# Patient Record
Sex: MALE | Race: WHITE | Employment: OTHER | URBAN - METROPOLITAN AREA
[De-identification: names, ages, dates, MRNs, and addresses within clinical notes are randomized per-mention and may not be internally consistent; named-entity substitution may affect disease eponyms.]

---

## 2018-04-27 PROBLEM — N18.2 TYPE 2 DIABETES MELLITUS WITH STAGE 2 CHRONIC KIDNEY DISEASE, WITH LONG-TERM CURRENT USE OF INSULIN (HCC): Status: ACTIVE | Noted: 2017-01-06

## 2018-04-27 PROBLEM — E66.9 OBESITY: Status: ACTIVE | Noted: 2017-01-06

## 2018-04-27 PROBLEM — N18.2 HYPERTENSIVE HEART AND KIDNEY DISEASE WITHOUT HEART FAILURE AND WITH STAGE 2 CHRONIC KIDNEY DISEASE: Status: ACTIVE | Noted: 2017-01-06

## 2018-04-27 PROBLEM — E11.22 TYPE 2 DIABETES MELLITUS WITH STAGE 2 CHRONIC KIDNEY DISEASE, WITH LONG-TERM CURRENT USE OF INSULIN (HCC): Status: ACTIVE | Noted: 2017-01-06

## 2018-04-27 PROBLEM — Z79.4 TYPE 2 DIABETES MELLITUS WITH STAGE 2 CHRONIC KIDNEY DISEASE, WITH LONG-TERM CURRENT USE OF INSULIN (HCC): Status: ACTIVE | Noted: 2017-01-06

## 2018-04-27 PROBLEM — I13.10 HYPERTENSIVE HEART AND KIDNEY DISEASE WITHOUT HEART FAILURE AND WITH STAGE 2 CHRONIC KIDNEY DISEASE: Status: ACTIVE | Noted: 2017-01-06

## 2018-04-27 PROBLEM — N18.2 CHRONIC KIDNEY DISEASE, STAGE II (MILD): Status: ACTIVE | Noted: 2017-01-06

## 2018-04-27 PROBLEM — E66.01 CLASS 3 SEVERE OBESITY DUE TO EXCESS CALORIES WITH SERIOUS COMORBIDITY AND BODY MASS INDEX (BMI) OF 40.0 TO 44.9 IN ADULT (HCC): Status: ACTIVE | Noted: 2017-01-06

## 2018-04-30 PROCEDURE — 87427 SHIGA-LIKE TOXIN AG IA: CPT | Performed by: INTERNAL MEDICINE

## 2018-04-30 PROCEDURE — 87077 CULTURE AEROBIC IDENTIFY: CPT | Performed by: INTERNAL MEDICINE

## 2018-04-30 PROCEDURE — 87045 FECES CULTURE AEROBIC BACT: CPT | Performed by: INTERNAL MEDICINE

## 2018-04-30 PROCEDURE — 87046 STOOL CULTR AEROBIC BACT EA: CPT | Performed by: INTERNAL MEDICINE

## 2018-07-25 PROBLEM — I25.10 ATHEROSCLEROSIS OF CORONARY ARTERY: Status: ACTIVE | Noted: 2018-07-25

## 2018-07-25 PROBLEM — I45.10 RBBB (RIGHT BUNDLE BRANCH BLOCK): Status: ACTIVE | Noted: 2018-07-25

## 2021-04-16 ENCOUNTER — TELEPHONE (OUTPATIENT)
Dept: FAMILY MEDICINE CLINIC | Facility: CLINIC | Age: 76
End: 2021-04-16

## 2021-04-16 NOTE — TELEPHONE ENCOUNTER
Surgery on 05/03/21, Nanette Freeman with Dr. Hansa Serna @ [address] [place] [address]    H&P- complete  Labs- FBS (138), A1C (7.5), HGB (12.9), Calc Osmo (296), AST (8), MRSA neg, all other labs WNL  EKG- abnormal  X-ray- WNL    Cardio- Dr. Judson Danielle in [address] [phone]   [phone]  Ca

## 2021-04-20 ENCOUNTER — HOSPITAL ENCOUNTER (OUTPATIENT)
Dept: GENERAL RADIOLOGY | Facility: HOSPITAL | Age: 76
Discharge: HOME | End: 2021-04-20
Attending: FAMILY MEDICINE
Payer: MEDICARE

## 2021-04-20 ENCOUNTER — LAB ENCOUNTER (OUTPATIENT)
Dept: LAB | Facility: HOSPITAL | Age: 76
End: 2021-04-20
Attending: FAMILY MEDICINE
Payer: MEDICARE

## 2021-04-20 ENCOUNTER — OFFICE VISIT (OUTPATIENT)
Dept: FAMILY MEDICINE CLINIC | Facility: CLINIC | Age: 76
End: 2021-04-20
Payer: MEDICARE

## 2021-04-20 VITALS
WEIGHT: 263 LBS | SYSTOLIC BLOOD PRESSURE: 132 MMHG | HEART RATE: 98 BPM | BODY MASS INDEX: 35.62 KG/M2 | HEIGHT: 72 IN | DIASTOLIC BLOOD PRESSURE: 70 MMHG | RESPIRATION RATE: 16 BRPM | OXYGEN SATURATION: 98 % | TEMPERATURE: 98 F

## 2021-04-20 DIAGNOSIS — G47.33 OSA ON CPAP: ICD-10-CM

## 2021-04-20 DIAGNOSIS — N18.2 TYPE 2 DIABETES MELLITUS WITH STAGE 2 CHRONIC KIDNEY DISEASE, WITH LONG-TERM CURRENT USE OF INSULIN (HCC): ICD-10-CM

## 2021-04-20 DIAGNOSIS — I13.10 HYPERTENSIVE HEART AND KIDNEY DISEASE WITHOUT HEART FAILURE AND WITH STAGE 2 CHRONIC KIDNEY DISEASE: ICD-10-CM

## 2021-04-20 DIAGNOSIS — Z01.812 PRE-OPERATIVE LABORATORY EXAMINATION: ICD-10-CM

## 2021-04-20 DIAGNOSIS — N35.819 OTHER STRICTURE OF URETHRA IN MALE: ICD-10-CM

## 2021-04-20 DIAGNOSIS — Z01.818 PREOP EXAMINATION: ICD-10-CM

## 2021-04-20 DIAGNOSIS — N52.9 VASCULOGENIC ERECTILE DYSFUNCTION, UNSPECIFIED VASCULOGENIC ERECTILE DYSFUNCTION TYPE: ICD-10-CM

## 2021-04-20 DIAGNOSIS — H25.89 OTHER AGE-RELATED CATARACT OF BOTH EYES: ICD-10-CM

## 2021-04-20 DIAGNOSIS — Z88.9 ALLERGY HISTORY, DRUG: ICD-10-CM

## 2021-04-20 DIAGNOSIS — M17.12 PRIMARY OSTEOARTHRITIS OF LEFT KNEE: Primary | ICD-10-CM

## 2021-04-20 DIAGNOSIS — Z94.7 HISTORY OF CORNEAL TRANSPLANT: ICD-10-CM

## 2021-04-20 DIAGNOSIS — E66.01 CLASS 3 SEVERE OBESITY DUE TO EXCESS CALORIES WITH SERIOUS COMORBIDITY AND BODY MASS INDEX (BMI) OF 40.0 TO 44.9 IN ADULT (HCC): ICD-10-CM

## 2021-04-20 DIAGNOSIS — Z87.440 HX OF RECURRENT URINARY TRACT INFECTION: ICD-10-CM

## 2021-04-20 DIAGNOSIS — E78.2 MIXED HYPERLIPIDEMIA: ICD-10-CM

## 2021-04-20 DIAGNOSIS — N40.1 BENIGN NON-NODULAR PROSTATIC HYPERPLASIA WITH LOWER URINARY TRACT SYMPTOMS: ICD-10-CM

## 2021-04-20 DIAGNOSIS — I25.10 ATHEROSCLEROSIS OF NATIVE CORONARY ARTERY OF NATIVE HEART WITHOUT ANGINA PECTORIS: ICD-10-CM

## 2021-04-20 DIAGNOSIS — Z79.4 TYPE 2 DIABETES MELLITUS WITH STAGE 2 CHRONIC KIDNEY DISEASE, WITH LONG-TERM CURRENT USE OF INSULIN (HCC): ICD-10-CM

## 2021-04-20 DIAGNOSIS — Z99.89 OSA ON CPAP: ICD-10-CM

## 2021-04-20 DIAGNOSIS — N52.9 ERECTILE DYSFUNCTION OF ORGANIC ORIGIN: ICD-10-CM

## 2021-04-20 DIAGNOSIS — K57.90 DIVERTICULOSIS: ICD-10-CM

## 2021-04-20 DIAGNOSIS — I48.4 ATYPICAL ATRIAL FLUTTER (HCC): ICD-10-CM

## 2021-04-20 DIAGNOSIS — N18.2 CHRONIC KIDNEY DISEASE, STAGE II (MILD): ICD-10-CM

## 2021-04-20 DIAGNOSIS — D64.9 CHRONIC ANEMIA: ICD-10-CM

## 2021-04-20 DIAGNOSIS — E11.22 TYPE 2 DIABETES MELLITUS WITH STAGE 2 CHRONIC KIDNEY DISEASE, WITH LONG-TERM CURRENT USE OF INSULIN (HCC): ICD-10-CM

## 2021-04-20 DIAGNOSIS — K21.9 GERD WITHOUT ESOPHAGITIS: ICD-10-CM

## 2021-04-20 DIAGNOSIS — I45.10 RBBB (RIGHT BUNDLE BRANCH BLOCK): ICD-10-CM

## 2021-04-20 DIAGNOSIS — E55.9 VITAMIN D DEFICIENCY: ICD-10-CM

## 2021-04-20 DIAGNOSIS — I10 ESSENTIAL HYPERTENSION, BENIGN: ICD-10-CM

## 2021-04-20 DIAGNOSIS — N18.2 HYPERTENSIVE HEART AND KIDNEY DISEASE WITHOUT HEART FAILURE AND WITH STAGE 2 CHRONIC KIDNEY DISEASE: ICD-10-CM

## 2021-04-20 PROCEDURE — 80053 COMPREHEN METABOLIC PANEL: CPT

## 2021-04-20 PROCEDURE — 85025 COMPLETE CBC W/AUTO DIFF WBC: CPT

## 2021-04-20 PROCEDURE — 99204 OFFICE O/P NEW MOD 45 MIN: CPT | Performed by: FAMILY MEDICINE

## 2021-04-20 PROCEDURE — 36415 COLL VENOUS BLD VENIPUNCTURE: CPT

## 2021-04-20 PROCEDURE — 81015 MICROSCOPIC EXAM OF URINE: CPT

## 2021-04-20 PROCEDURE — 3008F BODY MASS INDEX DOCD: CPT | Performed by: FAMILY MEDICINE

## 2021-04-20 PROCEDURE — 86900 BLOOD TYPING SEROLOGIC ABO: CPT

## 2021-04-20 PROCEDURE — 86850 RBC ANTIBODY SCREEN: CPT

## 2021-04-20 PROCEDURE — 71046 X-RAY EXAM CHEST 2 VIEWS: CPT | Performed by: FAMILY MEDICINE

## 2021-04-20 PROCEDURE — 93010 ELECTROCARDIOGRAM REPORT: CPT | Performed by: FAMILY MEDICINE

## 2021-04-20 PROCEDURE — 83036 HEMOGLOBIN GLYCOSYLATED A1C: CPT

## 2021-04-20 PROCEDURE — 3078F DIAST BP <80 MM HG: CPT | Performed by: FAMILY MEDICINE

## 2021-04-20 PROCEDURE — 93005 ELECTROCARDIOGRAM TRACING: CPT

## 2021-04-20 PROCEDURE — 3075F SYST BP GE 130 - 139MM HG: CPT | Performed by: FAMILY MEDICINE

## 2021-04-20 PROCEDURE — 87081 CULTURE SCREEN ONLY: CPT

## 2021-04-20 PROCEDURE — 86901 BLOOD TYPING SEROLOGIC RH(D): CPT

## 2021-04-20 RX ORDER — DULAGLUTIDE 0.75 MG/.5ML
1 INJECTION, SOLUTION SUBCUTANEOUS WEEKLY
COMMUNITY

## 2021-04-20 RX ORDER — CHLORAL HYDRATE 500 MG
1000 CAPSULE ORAL 2 TIMES DAILY
COMMUNITY

## 2021-04-20 RX ORDER — LINAGLIPTIN 5 MG/1
5 TABLET, FILM COATED ORAL EVERY EVENING
COMMUNITY

## 2021-04-20 NOTE — H&P (VIEW-ONLY)
[address] [facility] PRE-OP CLINIC [place]    PRE-OP NOTE    HPI:   I have been consulted by Dr. Brayan Cook to see Billy Alectisha 70year old male for a preoperative evaluation and medical clearance.  He has a long history of worsening severe degenerative arthritis of MG Oral Capsule Delayed Release TAKE 1 CAPSULE DAILY 90 capsule 0   • Vitamin D3 1000 units Oral Tab Take 2,000 Units by mouth 2 (two) times daily. • Sildenafil Citrate 20 MG Oral Tab Take 1 tablet (20 mg total) by mouth daily.  (Patient taking differen History    Socioeconomic History      Marital status:       Spouse name: Not on file      Number of children: Not on file      Years of education: Not on file      Highest education level: Not on file    Occupational History      Not on file    [place] palpitations, edema, dyspnea  RESPIRATORY: he is on treatment forOSA, Denies shortness of breath, wheezing, cough  GASTROINTESTINAL:  Denies abdominal pain, nausea, vomiting, constipation, diarrhea, or blood in stool.   MUSCULOSKELETAL: left knee arthritic (L) 04/20/2021    HCT 40.3 04/20/2021    .0 04/20/2021    CREATSERUM 0.99 04/20/2021    BUN 18 04/20/2021     04/20/2021    K 4.0 04/20/2021     04/20/2021    CO2 28.0 04/20/2021     (H) 04/20/2021    CA 9.6 04/20/2021    ALB 3.8 transplant    (G47.33,  Z99.89) DELFINO on CPAP    (N40.1) Benign non-nodular prostatic hyperplasia with lower urinary tract symptoms    (I48.4) Atypical atrial flutter (HCC)    (E55.9) Vitamin D deficiency    (E78.2) Mixed hyperlipidemia    (N52.9) Erectile d with serious comorbidity and body mass index (BMI) of 40.0 to 44.9 in adult [facility])     Iron deficiency anemia     Cataract     Chronic anemia     Atherosclerosis of coronary artery     Essential hypertension, benign     History of corneal transplant     DELFINO

## 2021-04-20 NOTE — H&P
[address] [facility]IC [facility]    PRE-OP NOTE    HPI:   I have been consulted by Dr. Mily Gongora to see Niharika Masterson 70year old male for a preoperative evaluation and medical clearance.  He has a long history of worsening severe degenerative arthritis of MG Oral Capsule Delayed Release TAKE 1 CAPSULE DAILY 90 capsule 0   • Vitamin D3 1000 units Oral Tab Take 2,000 Units by mouth 2 (two) times daily. • Sildenafil Citrate 20 MG Oral Tab Take 1 tablet (20 mg total) by mouth daily.  (Patient taking differen History    Socioeconomic History      Marital status:       Spouse name: Not on file      Number of children: Not on file      Years of education: Not on file      Highest education level: Not on file    Occupational History      Not on file    [place] palpitations, edema, dyspnea  RESPIRATORY: he is on treatment forOSA, Denies shortness of breath, wheezing, cough  GASTROINTESTINAL:  Denies abdominal pain, nausea, vomiting, constipation, diarrhea, or blood in stool.   MUSCULOSKELETAL: left knee arthritic (L) 04/20/2021    HCT 40.3 04/20/2021    .0 04/20/2021    CREATSERUM 0.99 04/20/2021    BUN 18 04/20/2021     04/20/2021    K 4.0 04/20/2021     04/20/2021    CO2 28.0 04/20/2021     (H) 04/20/2021    CA 9.6 04/20/2021    ALB 3.8 transplant    (G47.33,  Z99.89) DELFINO on CPAP    (N40.1) Benign non-nodular prostatic hyperplasia with lower urinary tract symptoms    (I48.4) Atypical atrial flutter (HCC)    (E55.9) Vitamin D deficiency    (E78.2) Mixed hyperlipidemia    (N52.9) Erectile d with serious comorbidity and body mass index (BMI) of 40.0 to 44.9 in adult [facility])     Iron deficiency anemia     Cataract     Chronic anemia     Atherosclerosis of coronary artery     Essential hypertension, benign     History of corneal transplant     DELFINO

## 2021-04-21 PROBLEM — M17.12 OSTEOARTHRITIS OF LEFT KNEE: Status: ACTIVE | Noted: 2021-04-21

## 2021-04-21 NOTE — TELEPHONE ENCOUNTER
Dr. Aly Rodriguez, please review:    Labs- FBS (138), A1C (7.5), HGB (12.9), Calc Osmo (296), AST (8), MRSA neg, all other labs WNL  EKG- abnormal  X-ray- WNL    Cardio- Dr. Jane Anderson in [address] [phone]  - [phone]  Cardiac Clearance scanned into Media 04/

## 2021-04-21 NOTE — DISCHARGE SUMMARY
Ortho Discharge Summary     Patient ID:  Alejandrina Tinajero  [id number]  51 year old  7/4/1945      Admit Date: 5/3/2021  6:55 AM    Discharge Date and Time: 5/4/2021  2:24 PM      Attending Physician: Tiera Clemens MD     Reason for admission: left knee D

## 2021-04-23 NOTE — TELEPHONE ENCOUNTER
Spoke to patient, went over test results and let him know he is clear for surgery per Dr. Calvin Maynard.  Patient will anticipate a phone call from the hospital to schedule COVID test.

## 2021-04-30 ENCOUNTER — LAB ENCOUNTER (OUTPATIENT)
Dept: LAB | Age: 76
End: 2021-04-30
Attending: ORTHOPAEDIC SURGERY
Payer: MEDICARE

## 2021-04-30 DIAGNOSIS — Z01.818 PREOP TESTING: ICD-10-CM

## 2021-04-30 RX ORDER — OXYCODONE HYDROCHLORIDE 5 MG/1
10 TABLET ORAL ONCE
Status: CANCELLED | OUTPATIENT
Start: 2021-04-30 | End: 2021-04-30

## 2021-05-03 ENCOUNTER — APPOINTMENT (OUTPATIENT)
Dept: GENERAL RADIOLOGY | Facility: HOSPITAL | Age: 76
End: 2021-05-03
Attending: PHYSICIAN ASSISTANT
Payer: MEDICARE

## 2021-05-03 ENCOUNTER — ANESTHESIA EVENT (OUTPATIENT)
Dept: SURGERY | Facility: HOSPITAL | Age: 76
End: 2021-05-03
Payer: MEDICARE

## 2021-05-03 ENCOUNTER — ANESTHESIA (OUTPATIENT)
Dept: SURGERY | Facility: HOSPITAL | Age: 76
End: 2021-05-03
Payer: MEDICARE

## 2021-05-03 ENCOUNTER — HOSPITAL ENCOUNTER (OUTPATIENT)
Facility: HOSPITAL | Age: 76
Discharge: HOME HEALTH | End: 2021-05-04
Attending: ORTHOPAEDIC SURGERY | Admitting: ORTHOPAEDIC SURGERY
Payer: MEDICARE

## 2021-05-03 DIAGNOSIS — Z01.818 PREOP TESTING: ICD-10-CM

## 2021-05-03 DIAGNOSIS — M17.12 PRIMARY OSTEOARTHRITIS OF LEFT KNEE: Primary | ICD-10-CM

## 2021-05-03 PROCEDURE — 94660 CPAP INITIATION&MGMT: CPT

## 2021-05-03 PROCEDURE — 88305 TISSUE EXAM BY PATHOLOGIST: CPT | Performed by: ORTHOPAEDIC SURGERY

## 2021-05-03 PROCEDURE — 97161 PT EVAL LOW COMPLEX 20 MIN: CPT

## 2021-05-03 PROCEDURE — 82962 GLUCOSE BLOOD TEST: CPT

## 2021-05-03 PROCEDURE — 0SRD069 REPLACEMENT OF LEFT KNEE JOINT WITH OXIDIZED ZIRCONIUM ON POLYETHYLENE SYNTHETIC SUBSTITUTE, CEMENTED, OPEN APPROACH: ICD-10-PCS | Performed by: ORTHOPAEDIC SURGERY

## 2021-05-03 PROCEDURE — 80053 COMPREHEN METABOLIC PANEL: CPT | Performed by: PHYSICIAN ASSISTANT

## 2021-05-03 PROCEDURE — 97110 THERAPEUTIC EXERCISES: CPT

## 2021-05-03 PROCEDURE — 85018 HEMOGLOBIN: CPT | Performed by: PHYSICIAN ASSISTANT

## 2021-05-03 PROCEDURE — 73560 X-RAY EXAM OF KNEE 1 OR 2: CPT | Performed by: PHYSICIAN ASSISTANT

## 2021-05-03 PROCEDURE — 85014 HEMATOCRIT: CPT | Performed by: PHYSICIAN ASSISTANT

## 2021-05-03 PROCEDURE — 94002 VENT MGMT INPAT INIT DAY: CPT

## 2021-05-03 PROCEDURE — 88311 DECALCIFY TISSUE: CPT | Performed by: ORTHOPAEDIC SURGERY

## 2021-05-03 DEVICE — PSN FEM CR CMT CCR STD SZ11 L: Type: IMPLANTABLE DEVICE | Site: KNEE | Status: FUNCTIONAL

## 2021-05-03 DEVICE — CEM BN NLTX STRL REUSE MED VSC: Type: IMPLANTABLE DEVICE | Site: KNEE | Status: FUNCTIONAL

## 2021-05-03 DEVICE — PSN ALL POLY PAT PLY 38MM: Type: IMPLANTABLE DEVICE | Site: KNEE | Status: FUNCTIONAL

## 2021-05-03 DEVICE — PSN TIB STM 5 DEG SZ G L: Type: IMPLANTABLE DEVICE | Site: KNEE | Status: FUNCTIONAL

## 2021-05-03 DEVICE — IMPLANTABLE DEVICE: Type: IMPLANTABLE DEVICE | Site: KNEE | Status: FUNCTIONAL

## 2021-05-03 RX ORDER — MORPHINE SULFATE 4 MG/ML
2 INJECTION, SOLUTION INTRAMUSCULAR; INTRAVENOUS EVERY 10 MIN PRN
Status: DISCONTINUED | OUTPATIENT
Start: 2021-05-03 | End: 2021-05-03 | Stop reason: HOSPADM

## 2021-05-03 RX ORDER — MIDAZOLAM HYDROCHLORIDE 1 MG/ML
INJECTION INTRAMUSCULAR; INTRAVENOUS AS NEEDED
Status: DISCONTINUED | OUTPATIENT
Start: 2021-05-03 | End: 2021-05-03 | Stop reason: SURG

## 2021-05-03 RX ORDER — ONDANSETRON 4 MG/1
4 TABLET, FILM COATED ORAL EVERY 8 HOURS PRN
Qty: 30 TABLET | Refills: 0 | Status: SHIPPED | OUTPATIENT
Start: 2021-05-03

## 2021-05-03 RX ORDER — MAGNESIUM OXIDE 400 MG (241.3 MG MAGNESIUM) TABLET
1 TABLET NIGHTLY
Status: DISCONTINUED | OUTPATIENT
Start: 2021-05-03 | End: 2021-05-04

## 2021-05-03 RX ORDER — DOCUSATE SODIUM 100 MG/1
100 CAPSULE, LIQUID FILLED ORAL 2 TIMES DAILY
Qty: 60 CAPSULE | Refills: 2 | Status: SHIPPED | OUTPATIENT
Start: 2021-05-03

## 2021-05-03 RX ORDER — HYDROMORPHONE HYDROCHLORIDE 1 MG/ML
0.4 INJECTION, SOLUTION INTRAMUSCULAR; INTRAVENOUS; SUBCUTANEOUS EVERY 5 MIN PRN
Status: DISCONTINUED | OUTPATIENT
Start: 2021-05-03 | End: 2021-05-03 | Stop reason: HOSPADM

## 2021-05-03 RX ORDER — PROCHLORPERAZINE EDISYLATE 5 MG/ML
10 INJECTION INTRAMUSCULAR; INTRAVENOUS EVERY 6 HOURS PRN
Status: DISCONTINUED | OUTPATIENT
Start: 2021-05-03 | End: 2021-05-04

## 2021-05-03 RX ORDER — TRAMADOL HYDROCHLORIDE 50 MG/1
TABLET ORAL EVERY 4 HOURS PRN
Qty: 60 TABLET | Refills: 0 | Status: SHIPPED | OUTPATIENT
Start: 2021-05-03

## 2021-05-03 RX ORDER — ONDANSETRON 2 MG/ML
INJECTION INTRAMUSCULAR; INTRAVENOUS AS NEEDED
Status: DISCONTINUED | OUTPATIENT
Start: 2021-05-03 | End: 2021-05-03 | Stop reason: SURG

## 2021-05-03 RX ORDER — CEFAZOLIN SODIUM/WATER 2 G/20 ML
2 SYRINGE (ML) INTRAVENOUS EVERY 8 HOURS
Status: COMPLETED | OUTPATIENT
Start: 2021-05-03 | End: 2021-05-04

## 2021-05-03 RX ORDER — TRAMADOL HYDROCHLORIDE 50 MG/1
50 TABLET ORAL EVERY 6 HOURS PRN
Status: DISCONTINUED | OUTPATIENT
Start: 2021-05-03 | End: 2021-05-03

## 2021-05-03 RX ORDER — DIPHENHYDRAMINE HYDROCHLORIDE 50 MG/ML
12.5 INJECTION INTRAMUSCULAR; INTRAVENOUS EVERY 4 HOURS PRN
Status: DISCONTINUED | OUTPATIENT
Start: 2021-05-03 | End: 2021-05-04

## 2021-05-03 RX ORDER — BUPIVACAINE HYDROCHLORIDE 7.5 MG/ML
INJECTION, SOLUTION INTRASPINAL AS NEEDED
Status: DISCONTINUED | OUTPATIENT
Start: 2021-05-03 | End: 2021-05-03 | Stop reason: SURG

## 2021-05-03 RX ORDER — DIPHENHYDRAMINE HCL 25 MG
25 CAPSULE ORAL EVERY 4 HOURS PRN
Status: DISCONTINUED | OUTPATIENT
Start: 2021-05-03 | End: 2021-05-04

## 2021-05-03 RX ORDER — POLYETHYLENE GLYCOL 3350 17 G/17G
17 POWDER, FOR SOLUTION ORAL DAILY PRN
Status: DISCONTINUED | OUTPATIENT
Start: 2021-05-03 | End: 2021-05-04

## 2021-05-03 RX ORDER — MAGNESIUM HYDROXIDE 1200 MG/15ML
LIQUID ORAL CONTINUOUS PRN
Status: COMPLETED | OUTPATIENT
Start: 2021-05-03 | End: 2021-05-03

## 2021-05-03 RX ORDER — OXYCODONE HYDROCHLORIDE 5 MG/1
10 TABLET ORAL EVERY 4 HOURS PRN
Status: DISCONTINUED | OUTPATIENT
Start: 2021-05-03 | End: 2021-05-04

## 2021-05-03 RX ORDER — FAMOTIDINE 20 MG/1
20 TABLET ORAL ONCE
Status: COMPLETED | OUTPATIENT
Start: 2021-05-03 | End: 2021-05-03

## 2021-05-03 RX ORDER — HYDROMORPHONE HYDROCHLORIDE 1 MG/ML
0.4 INJECTION, SOLUTION INTRAMUSCULAR; INTRAVENOUS; SUBCUTANEOUS EVERY 2 HOUR PRN
Status: DISCONTINUED | OUTPATIENT
Start: 2021-05-03 | End: 2021-05-04

## 2021-05-03 RX ORDER — MORPHINE SULFATE 10 MG/ML
6 INJECTION, SOLUTION INTRAMUSCULAR; INTRAVENOUS EVERY 10 MIN PRN
Status: DISCONTINUED | OUTPATIENT
Start: 2021-05-03 | End: 2021-05-03 | Stop reason: HOSPADM

## 2021-05-03 RX ORDER — TRAMADOL HYDROCHLORIDE 50 MG/1
100 TABLET ORAL EVERY 6 HOURS PRN
Status: DISCONTINUED | OUTPATIENT
Start: 2021-05-03 | End: 2021-05-03

## 2021-05-03 RX ORDER — HYDROMORPHONE HYDROCHLORIDE 1 MG/ML
0.6 INJECTION, SOLUTION INTRAMUSCULAR; INTRAVENOUS; SUBCUTANEOUS EVERY 5 MIN PRN
Status: DISCONTINUED | OUTPATIENT
Start: 2021-05-03 | End: 2021-05-03 | Stop reason: HOSPADM

## 2021-05-03 RX ORDER — SCOLOPAMINE TRANSDERMAL SYSTEM 1 MG/1
1 PATCH, EXTENDED RELEASE TRANSDERMAL ONCE
Status: DISCONTINUED | OUTPATIENT
Start: 2021-05-03 | End: 2021-05-04

## 2021-05-03 RX ORDER — CEFAZOLIN SODIUM/WATER 2 G/20 ML
2 SYRINGE (ML) INTRAVENOUS ONCE
Status: COMPLETED | OUTPATIENT
Start: 2021-05-03 | End: 2021-05-03

## 2021-05-03 RX ORDER — INSULIN ASPART 100 [IU]/ML
64 INJECTION, SOLUTION INTRAVENOUS; SUBCUTANEOUS
Status: DISCONTINUED | OUTPATIENT
Start: 2021-05-03 | End: 2021-05-03

## 2021-05-03 RX ORDER — ONDANSETRON 2 MG/ML
4 INJECTION INTRAMUSCULAR; INTRAVENOUS ONCE AS NEEDED
Status: DISCONTINUED | OUTPATIENT
Start: 2021-05-03 | End: 2021-05-03 | Stop reason: HOSPADM

## 2021-05-03 RX ORDER — PANTOPRAZOLE SODIUM 40 MG/1
40 TABLET, DELAYED RELEASE ORAL
Qty: 30 TABLET | Refills: 0 | Status: SHIPPED | OUTPATIENT
Start: 2021-05-03 | End: 2021-05-03

## 2021-05-03 RX ORDER — HYDROMORPHONE HYDROCHLORIDE 1 MG/ML
1.2 INJECTION, SOLUTION INTRAMUSCULAR; INTRAVENOUS; SUBCUTANEOUS EVERY 2 HOUR PRN
Status: DISCONTINUED | OUTPATIENT
Start: 2021-05-03 | End: 2021-05-04

## 2021-05-03 RX ORDER — OXYCODONE HYDROCHLORIDE 5 MG/1
5 TABLET ORAL EVERY 4 HOURS PRN
Status: DISCONTINUED | OUTPATIENT
Start: 2021-05-03 | End: 2021-05-04

## 2021-05-03 RX ORDER — DIPHENHYDRAMINE HYDROCHLORIDE 50 MG/ML
25 INJECTION INTRAMUSCULAR; INTRAVENOUS ONCE AS NEEDED
Status: ACTIVE | OUTPATIENT
Start: 2021-05-03 | End: 2021-05-03

## 2021-05-03 RX ORDER — SODIUM CHLORIDE, SODIUM LACTATE, POTASSIUM CHLORIDE, CALCIUM CHLORIDE 600; 310; 30; 20 MG/100ML; MG/100ML; MG/100ML; MG/100ML
INJECTION, SOLUTION INTRAVENOUS CONTINUOUS
Status: DISCONTINUED | OUTPATIENT
Start: 2021-05-03 | End: 2021-05-04

## 2021-05-03 RX ORDER — DOCUSATE SODIUM 100 MG/1
100 CAPSULE, LIQUID FILLED ORAL 2 TIMES DAILY
Status: DISCONTINUED | OUTPATIENT
Start: 2021-05-03 | End: 2021-05-04

## 2021-05-03 RX ORDER — ACETAMINOPHEN 325 MG/1
650 TABLET ORAL EVERY 6 HOURS SCHEDULED
Status: DISCONTINUED | OUTPATIENT
Start: 2021-05-03 | End: 2021-05-04

## 2021-05-03 RX ORDER — METOPROLOL TARTRATE 5 MG/5ML
2.5 INJECTION INTRAVENOUS ONCE
Status: DISCONTINUED | OUTPATIENT
Start: 2021-05-03 | End: 2021-05-03 | Stop reason: HOSPADM

## 2021-05-03 RX ORDER — FAMOTIDINE 20 MG/1
20 TABLET ORAL NIGHTLY
Status: DISCONTINUED | OUTPATIENT
Start: 2021-05-03 | End: 2021-05-04

## 2021-05-03 RX ORDER — HYDROCODONE BITARTRATE AND ACETAMINOPHEN 5; 325 MG/1; MG/1
2 TABLET ORAL AS NEEDED
Status: DISCONTINUED | OUTPATIENT
Start: 2021-05-03 | End: 2021-05-03 | Stop reason: HOSPADM

## 2021-05-03 RX ORDER — SODIUM PHOSPHATE, DIBASIC AND SODIUM PHOSPHATE, MONOBASIC 7; 19 G/133ML; G/133ML
1 ENEMA RECTAL ONCE AS NEEDED
Status: DISCONTINUED | OUTPATIENT
Start: 2021-05-03 | End: 2021-05-04

## 2021-05-03 RX ORDER — TRANEXAMIC ACID 10 MG/ML
INJECTION, SOLUTION INTRAVENOUS AS NEEDED
Status: DISCONTINUED | OUTPATIENT
Start: 2021-05-03 | End: 2021-05-03 | Stop reason: SURG

## 2021-05-03 RX ORDER — HYDROCHLOROTHIAZIDE 25 MG/1
25 TABLET ORAL DAILY
Status: DISCONTINUED | OUTPATIENT
Start: 2021-05-04 | End: 2021-05-03

## 2021-05-03 RX ORDER — HYDROCODONE BITARTRATE AND ACETAMINOPHEN 5; 325 MG/1; MG/1
1 TABLET ORAL AS NEEDED
Status: DISCONTINUED | OUTPATIENT
Start: 2021-05-03 | End: 2021-05-03 | Stop reason: HOSPADM

## 2021-05-03 RX ORDER — BISACODYL 10 MG
10 SUPPOSITORY, RECTAL RECTAL
Status: DISCONTINUED | OUTPATIENT
Start: 2021-05-03 | End: 2021-05-04

## 2021-05-03 RX ORDER — LOSARTAN POTASSIUM 100 MG/1
100 TABLET ORAL DAILY
Status: DISCONTINUED | OUTPATIENT
Start: 2021-05-03 | End: 2021-05-04

## 2021-05-03 RX ORDER — CELECOXIB 200 MG/1
200 CAPSULE ORAL ONCE
Status: COMPLETED | OUTPATIENT
Start: 2021-05-03 | End: 2021-05-03

## 2021-05-03 RX ORDER — ONDANSETRON 2 MG/ML
4 INJECTION INTRAMUSCULAR; INTRAVENOUS EVERY 4 HOURS PRN
Status: DISCONTINUED | OUTPATIENT
Start: 2021-05-03 | End: 2021-05-04

## 2021-05-03 RX ORDER — HYDROMORPHONE HYDROCHLORIDE 1 MG/ML
0.2 INJECTION, SOLUTION INTRAMUSCULAR; INTRAVENOUS; SUBCUTANEOUS EVERY 5 MIN PRN
Status: DISCONTINUED | OUTPATIENT
Start: 2021-05-03 | End: 2021-05-03 | Stop reason: HOSPADM

## 2021-05-03 RX ORDER — ASPIRIN 325 MG
325 TABLET, DELAYED RELEASE (ENTERIC COATED) ORAL 2 TIMES DAILY
Qty: 60 TABLET | Refills: 0 | Status: SHIPPED | OUTPATIENT
Start: 2021-05-03

## 2021-05-03 RX ORDER — HYDROMORPHONE HYDROCHLORIDE 1 MG/ML
0.8 INJECTION, SOLUTION INTRAMUSCULAR; INTRAVENOUS; SUBCUTANEOUS EVERY 2 HOUR PRN
Status: DISCONTINUED | OUTPATIENT
Start: 2021-05-03 | End: 2021-05-04

## 2021-05-03 RX ORDER — OXYCODONE HYDROCHLORIDE 5 MG/1
TABLET ORAL EVERY 4 HOURS PRN
Qty: 60 TABLET | Refills: 0 | Status: SHIPPED | OUTPATIENT
Start: 2021-05-03

## 2021-05-03 RX ORDER — ASPIRIN 325 MG
325 TABLET, DELAYED RELEASE (ENTERIC COATED) ORAL 2 TIMES DAILY
Status: DISCONTINUED | OUTPATIENT
Start: 2021-05-03 | End: 2021-05-04

## 2021-05-03 RX ORDER — TRANEXAMIC ACID 10 MG/ML
1000 INJECTION, SOLUTION INTRAVENOUS ONCE
Status: COMPLETED | OUTPATIENT
Start: 2021-05-03 | End: 2021-05-04

## 2021-05-03 RX ORDER — DEXTROSE MONOHYDRATE 25 G/50ML
50 INJECTION, SOLUTION INTRAVENOUS
Status: DISCONTINUED | OUTPATIENT
Start: 2021-05-03 | End: 2021-05-04

## 2021-05-03 RX ORDER — EZETIMIBE 10 MG/1
10 TABLET ORAL NIGHTLY
Status: DISCONTINUED | OUTPATIENT
Start: 2021-05-03 | End: 2021-05-04

## 2021-05-03 RX ORDER — MORPHINE SULFATE 4 MG/ML
4 INJECTION, SOLUTION INTRAMUSCULAR; INTRAVENOUS EVERY 10 MIN PRN
Status: DISCONTINUED | OUTPATIENT
Start: 2021-05-03 | End: 2021-05-03 | Stop reason: HOSPADM

## 2021-05-03 RX ORDER — PROCHLORPERAZINE EDISYLATE 5 MG/ML
5 INJECTION INTRAMUSCULAR; INTRAVENOUS ONCE AS NEEDED
Status: DISCONTINUED | OUTPATIENT
Start: 2021-05-03 | End: 2021-05-03 | Stop reason: HOSPADM

## 2021-05-03 RX ORDER — CEFADROXIL 500 MG/1
500 CAPSULE ORAL 2 TIMES DAILY
Qty: 14 CAPSULE | Refills: 0 | Status: SHIPPED | OUTPATIENT
Start: 2021-05-03 | End: 2021-05-10

## 2021-05-03 RX ORDER — HALOPERIDOL 5 MG/ML
0.25 INJECTION INTRAMUSCULAR ONCE AS NEEDED
Status: DISCONTINUED | OUTPATIENT
Start: 2021-05-03 | End: 2021-05-03 | Stop reason: HOSPADM

## 2021-05-03 RX ORDER — NEBIVOLOL 5 MG/1
10 TABLET ORAL DAILY
Status: DISCONTINUED | OUTPATIENT
Start: 2021-05-04 | End: 2021-05-04

## 2021-05-03 RX ORDER — LIDOCAINE HYDROCHLORIDE 10 MG/ML
INJECTION, SOLUTION EPIDURAL; INFILTRATION; INTRACAUDAL; PERINEURAL AS NEEDED
Status: DISCONTINUED | OUTPATIENT
Start: 2021-05-03 | End: 2021-05-03 | Stop reason: SURG

## 2021-05-03 RX ORDER — SODIUM CHLORIDE, SODIUM LACTATE, POTASSIUM CHLORIDE, CALCIUM CHLORIDE 600; 310; 30; 20 MG/100ML; MG/100ML; MG/100ML; MG/100ML
INJECTION, SOLUTION INTRAVENOUS CONTINUOUS
Status: DISCONTINUED | OUTPATIENT
Start: 2021-05-03 | End: 2021-05-03 | Stop reason: HOSPADM

## 2021-05-03 RX ORDER — FAMOTIDINE 10 MG/ML
20 INJECTION, SOLUTION INTRAVENOUS NIGHTLY
Status: DISCONTINUED | OUTPATIENT
Start: 2021-05-03 | End: 2021-05-04

## 2021-05-03 RX ORDER — ACETAMINOPHEN 500 MG
1000 TABLET ORAL ONCE
Status: COMPLETED | OUTPATIENT
Start: 2021-05-03 | End: 2021-05-03

## 2021-05-03 RX ORDER — DEXTROSE MONOHYDRATE 25 G/50ML
50 INJECTION, SOLUTION INTRAVENOUS
Status: DISCONTINUED | OUTPATIENT
Start: 2021-05-03 | End: 2021-05-03 | Stop reason: HOSPADM

## 2021-05-03 RX ORDER — NALOXONE HYDROCHLORIDE 0.4 MG/ML
80 INJECTION, SOLUTION INTRAMUSCULAR; INTRAVENOUS; SUBCUTANEOUS AS NEEDED
Status: DISCONTINUED | OUTPATIENT
Start: 2021-05-03 | End: 2021-05-03 | Stop reason: HOSPADM

## 2021-05-03 RX ORDER — CYCLOBENZAPRINE HCL 5 MG
5 TABLET ORAL EVERY 8 HOURS PRN
Status: DISCONTINUED | OUTPATIENT
Start: 2021-05-03 | End: 2021-05-04

## 2021-05-03 RX ORDER — TAMSULOSIN HYDROCHLORIDE 0.4 MG/1
0.4 CAPSULE ORAL DAILY
Status: DISCONTINUED | OUTPATIENT
Start: 2021-05-03 | End: 2021-05-04

## 2021-05-03 RX ORDER — ACETAMINOPHEN 500 MG
TABLET ORAL EVERY 6 HOURS PRN
Qty: 90 TABLET | Refills: 0 | Status: SHIPPED | OUTPATIENT
Start: 2021-05-03

## 2021-05-03 RX ORDER — METOCLOPRAMIDE 10 MG/1
10 TABLET ORAL ONCE
Status: COMPLETED | OUTPATIENT
Start: 2021-05-03 | End: 2021-05-03

## 2021-05-03 RX ORDER — AMLODIPINE BESYLATE 10 MG/1
10 TABLET ORAL DAILY
Status: DISCONTINUED | OUTPATIENT
Start: 2021-05-04 | End: 2021-05-04

## 2021-05-03 RX ADMIN — TRANEXAMIC ACID 1000 MG: 10 INJECTION, SOLUTION INTRAVENOUS at 10:15:00

## 2021-05-03 RX ADMIN — BUPIVACAINE HYDROCHLORIDE 1.5 ML: 7.5 INJECTION, SOLUTION INTRASPINAL at 10:05:00

## 2021-05-03 RX ADMIN — CEFAZOLIN SODIUM/WATER 1 G: 2 G/20 ML SYRINGE (ML) INTRAVENOUS at 10:15:00

## 2021-05-03 RX ADMIN — CEFAZOLIN SODIUM/WATER 2 G: 2 G/20 ML SYRINGE (ML) INTRAVENOUS at 10:06:00

## 2021-05-03 RX ADMIN — ONDANSETRON 4 MG: 2 INJECTION INTRAMUSCULAR; INTRAVENOUS at 12:08:00

## 2021-05-03 RX ADMIN — MIDAZOLAM HYDROCHLORIDE 2 MG: 1 INJECTION INTRAMUSCULAR; INTRAVENOUS at 09:58:00

## 2021-05-03 RX ADMIN — LIDOCAINE HYDROCHLORIDE 15 MG: 10 INJECTION, SOLUTION EPIDURAL; INFILTRATION; INTRACAUDAL; PERINEURAL at 10:03:00

## 2021-05-03 RX ADMIN — SODIUM CHLORIDE, SODIUM LACTATE, POTASSIUM CHLORIDE, CALCIUM CHLORIDE: 600; 310; 30; 20 INJECTION, SOLUTION INTRAVENOUS at 09:58:00

## 2021-05-03 NOTE — PROGRESS NOTES
[facility]    Ortho Medical Progress Note     Pura Hinsonedy Patient Status:  Outpatient in a Bed    1945 MRN [id number]   Location [facility] 4W/SW/SE Attending Jose Mcgee MD   Hosp Day # 0 PCP SUSAN Anderson MD with long-term current use of insulin (HCC) - no NSAIDS        Mixed hyperlipidemia        Chronic anemia        Essential hypertension, benign        DELFINO on CPAP        RBBB (right bundle branch block)    XR KNEE (1 OR 2 VIEWS), LEFT (CPT=73560)    Result

## 2021-05-03 NOTE — CM/SW NOTE
SW received MDO for Surgery. SW met with pt to complete an initial assessment. SW confirmed pt's address and phone number with the pt. Pt lives in a 1 level  house with spouse. There is/are 4 steps into the home and 0 steps to the bedrooms.  Pt states havin

## 2021-05-03 NOTE — ANESTHESIA PROCEDURE NOTES
Peripheral IV  Date/Time: 5/3/2021 10:27 AM  Inserted by: Lonnie Alston., CRNA    Placement  Needle size: 18 G  Laterality: left  Location: hand  Local anesthetic: none  Site prep: alcohol  Technique: anatomical landmarks  Attempts: 1

## 2021-05-03 NOTE — ANESTHESIA POSTPROCEDURE EVALUATION
Patient: Idania Figueredo    Procedure Summary     Date: 05/03/21 Room / Location: [address] [place] [address] MAIN OR 04 / 300 [facility] MAIN OR    Anesthesia Start: 7379 Anesthesia Stop:     Procedure: left total knee arthroplasty (Left Knee) Diagnosis: (left knee degenerative joint dis

## 2021-05-03 NOTE — PHYSICAL THERAPY NOTE
PHYSICAL THERAPY KNEE EVALUATION - INPATIENT       Room Number: 406/406-A  Evaluation Date: 5/3/2021  Type of Evaluation: Initial  Physician Order: PT Eval and Treat    Presenting Problem: L TKA  Reason for Therapy: Mobility Dysfunction and Discharge Plann have been consulted by Dr. Aaliyah Olsen to see Day Jose 70year old male for a preoperative evaluation and medical clearance. He has a long history of worsening severe degenerative arthritis of his left knee.  Patient is to have a left total knee arthrop Surgical History  Past Surgical History:   Procedure Laterality Date   • ANGIOPLASTY (CORONARY)  07/2015   • CATH DRUG ELUTING STENT  2015   • CHOLECYSTECTOMY     • COLONOSCOPY     • COLONOSCOPY     • EGD  09/05/2006   • EYE SURGERY Right 03/2012    cornea currently need. ..   -   Moving to and from a bed to a chair (including a wheelchair)?: A Little   -   Need to walk in hospital room?: A Little   -   Climbing 3-5 steps with a railing?: A Little     AM-PAC Score:  Raw Score: 18   Approx Degree of Impairment

## 2021-05-03 NOTE — PLAN OF CARE
Rafael Hernandez is from home with spouse. A&O x 4. Ac/hs glucose monitoring-sliding scale as indicated. Oob to chair wbat with rolling walker and 1 assist. PT eval pending. Pain control with scheduled tylenol/gel ice wrap/oxy ir. Sx site c/d/I-no s/s infection.  Safe comfort level or patient's stated pain goal  Description: INTERVENTIONS:  - Encourage pt to monitor pain and request assistance  - Assess pain using appropriate pain scale  - Administer analgesics based on type and severity of pain and evaluate response  - care, etc)  - Arrange for interpreters to assist at discharge as needed  - Consider post-discharge preferences of patient/family/discharge partner  - Complete POLST form as appropriate  - Assess patient's ability to be responsible for managing their own he

## 2021-05-03 NOTE — INTERVAL H&P NOTE
Pre-op Diagnosis: left knee degenerative joint disease    The above referenced H&P was reviewed by EILEEN Cardona on 5/3/2021, the patient was examined and no significant changes have occurred in the patient's condition since the H&P was performed.   I

## 2021-05-03 NOTE — ANESTHESIA PREPROCEDURE EVALUATION
Anesthesia PreOp Note    HPI:     Alejandrina Tinajero is a 70year old male who presents for preoperative consultation requested by: Tiera Clemens MD    Date of Surgery: 5/3/2021    Procedure(s):  left total knee arthroplasty  Indication: left knee degenera 04/07/2016      Atypical atrial flutter ([facility] [address]         Date Noted: 11/24/2015      Coronary artery disease involving native coronary artery of native heart without angina pectoris         Date Noted: 11/24/2015      Vitamin D deficiency         Date Noted: daily., Disp: , Rfl: , Past Week at Unknown time  amLODIPine Besylate 10 MG Oral Tab, Take 10 mg by mouth daily. , Disp: , Rfl: , 5/3/2021 at Unknown time  valsartan 160 MG Oral Tab, Take 160 mg by mouth daily. , Disp: , Rfl: , 5/2/2021 at Unknown time  hydr time  Glucose Blood (ONETOUCH ULTRA BLUE) In Vitro Strip, 1 strip by In Vitro route 2 (two) times daily.  Dx E11.9, Disp: 200 strip, Rfl: 1  BD PEN NEEDLE ULTRAFINE 29G X 12.7MM Does not apply Misc, USE AS DIRECTED UNDER THE SKIN TWICE A DAY, Disp: 180 each used    Substance and Sexual Activity      Alcohol use:  Yes        Alcohol/week: 1.0 standard drinks        Types: 1 Cans of beer per week        Comment: every couple of weeks      Drug use: Never      Sexual activity: Not on file    Other Topics      Con is 70. His respiration is 20 and oxygen saturation is 96%.     04/29/21  1152 05/03/21  0704   BP:  141/64   Pulse:  70   Resp:  20   Temp:  98.2 °F (36.8 °C)   TempSrc:  Oral   SpO2:  96%   Weight: 119.3 kg (263 lb)    Height: 1.829 m (6')         Anesthes

## 2021-05-03 NOTE — ANESTHESIA PREPROCEDURE EVALUATION
Anesthesia PreOp Note    HPI:     Aydin Lou is a 70year old male who presents for preoperative consultation requested by: Delon Paiz MD    Date of Surgery: 5/3/2021    Procedure(s):  left total knee arthroplasty  Indication: left knee degenera 04/07/2016      Atypical atrial flutter ([facility] [address]         Date Noted: 11/24/2015      Coronary artery disease involving native coronary artery of native heart without angina pectoris         Date Noted: 11/24/2015      Vitamin D deficiency         Date Noted: daily., Disp: , Rfl: , Past Week at Unknown time  amLODIPine Besylate 10 MG Oral Tab, Take 10 mg by mouth daily. , Disp: , Rfl: , 5/3/2021 at Unknown time  valsartan 160 MG Oral Tab, Take 160 mg by mouth daily. , Disp: , Rfl: , 5/2/2021 at Unknown time  hydr time  Glucose Blood (ONETOUCH ULTRA BLUE) In Vitro Strip, 1 strip by In Vitro route 2 (two) times daily.  Dx E11.9, Disp: 200 strip, Rfl: 1  BD PEN NEEDLE ULTRAFINE 29G X 12.7MM Does not apply Misc, USE AS DIRECTED UNDER THE SKIN TWICE A DAY, Disp: 180 each used    Substance and Sexual Activity      Alcohol use:  Yes        Alcohol/week: 1.0 standard drinks        Types: 1 Cans of beer per week        Comment: every couple of weeks      Drug use: Never      Sexual activity: Not on file    Other Topics      Con is 70. His respiration is 20 and oxygen saturation is 96%.     04/29/21  1152 05/03/21  0704   BP:  141/64   Pulse:  70   Resp:  20   Temp:  98.2 °F (36.8 °C)   TempSrc:  Oral   SpO2:  96%   Weight: 119.3 kg (263 lb)    Height: 1.829 m (6')         Anesthes

## 2021-05-03 NOTE — ANESTHESIA PROCEDURE NOTES
Spinal Block  Performed by: Patricia Vazquez, CRNA  Authorized by: Fish Cook MD       General Information and Staff    Start Time:  5/3/2021 10:03 AM  End Time:  5/3/2021 10:05 AM  Anesthesiologist:  Fish Cook MD  CRNA:  Boni Delgado

## 2021-05-04 VITALS
HEIGHT: 72 IN | TEMPERATURE: 99 F | WEIGHT: 263 LBS | OXYGEN SATURATION: 94 % | HEART RATE: 73 BPM | DIASTOLIC BLOOD PRESSURE: 69 MMHG | SYSTOLIC BLOOD PRESSURE: 142 MMHG | BODY MASS INDEX: 35.62 KG/M2 | RESPIRATION RATE: 16 BRPM

## 2021-05-04 PROCEDURE — 85027 COMPLETE CBC AUTOMATED: CPT | Performed by: FAMILY MEDICINE

## 2021-05-04 PROCEDURE — 97530 THERAPEUTIC ACTIVITIES: CPT

## 2021-05-04 PROCEDURE — 82962 GLUCOSE BLOOD TEST: CPT

## 2021-05-04 PROCEDURE — 97116 GAIT TRAINING THERAPY: CPT

## 2021-05-04 PROCEDURE — 80048 BASIC METABOLIC PNL TOTAL CA: CPT | Performed by: NURSE PRACTITIONER

## 2021-05-04 PROCEDURE — 97110 THERAPEUTIC EXERCISES: CPT

## 2021-05-04 RX ORDER — TRAMADOL HYDROCHLORIDE 50 MG/1
100 TABLET ORAL EVERY 4 HOURS PRN
Status: DISCONTINUED | OUTPATIENT
Start: 2021-05-04 | End: 2021-05-04

## 2021-05-04 RX ORDER — TRAMADOL HYDROCHLORIDE 50 MG/1
50 TABLET ORAL EVERY 6 HOURS PRN
Status: DISCONTINUED | OUTPATIENT
Start: 2021-05-04 | End: 2021-05-04

## 2021-05-04 RX ORDER — TRAMADOL HYDROCHLORIDE 50 MG/1
50 TABLET ORAL EVERY 6 HOURS PRN
Qty: 30 TABLET | Refills: 0 | Status: SHIPPED | COMMUNITY
Start: 2021-05-04

## 2021-05-04 NOTE — CM/SW NOTE
CM f/u with pt for choice. Pt requesting to use [address] from 5 years ago. CM reviewed care everywhere.   Unable to identify whom pt has used in the past.  CM met with pt at bedside, pt states he has number for [employer], however pt was unable to locate i

## 2021-05-04 NOTE — PROGRESS NOTES
[facility]    Progress Note    Flor Arguelles Patient Status:  Outpatient in a Bed    1945 MRN [id number]   Location [facility] 4W/SW/SE Attending Sushma Manning MD   Hosp Day # 0 PCP SUSAN Perez MD       Subjective: (right bundle branch block)      Plan for discharge to home today  Follow up Orthopedics      Results:     Lab Results   Component Value Date    WBC 7.7 05/04/2021    HGB 10.6 (L) 05/04/2021    HCT 34.0 (L) 05/04/2021    .0 (L) 05/04/2021    [place]

## 2021-05-04 NOTE — PLAN OF CARE
\"rosalia\" is from home with his wife. Alert and oriented x 4. Pod 1 l tkr. Cms at baseline-hx diabetic neuropathy. Ac/hs glucose monitoring-zainab diet . Pain controlled with oxy ir, ultram, tylenol, ice. Surgical dressing aquacel-no s/s infection.  Cl;eared P assistance  - Assess pain using appropriate pain scale  - Administer analgesics based on type and severity of pain and evaluate response  - Implement non-pharmacological measures as appropriate and evaluate response  - Consider cultural and social influenc post-discharge preferences of patient/family/discharge partner  - Complete POLST form as appropriate  - Assess patient's ability to be responsible for managing their own health  - Refer to Case Management Department for coordinating discharge planning if t

## 2021-05-04 NOTE — PHYSICAL THERAPY NOTE
PHYSICAL THERAPY KNEE TREATMENT NOTE - INPATIENT     Room Number: 406/406-A             Presenting Problem: L TKA    Problem List  Principal Problem:    Osteoarthritis of left knee  Active Problems:    Atypical atrial flutter (HCC)    Benign non-nodular pr impairment may benefit from [facility] PT than if progressing may go to OP PT.    DISCHARGE RECOMMENDATIONS  PT Discharge Recommendations: Home with home health PT; Intermittent Supervision    PLAN  PT Treatment Plan: Endurance;Gait training;Balance training;Stair with 1 HR    Additional Information:     Exercises AM Session PM Session   Ankle Pumps 10 reps 10 reps   Quad Sets 10 reps  reps   Glut Sets  reps  reps   Hip Abd/Add  reps  reps   Heel slides 15 reps  reps   Saq  reps  reps   SLR 5 reps  reps   Sitting Kn

## 2021-05-04 NOTE — OPERATIVE REPORT
[address] [place] [address], [facility] [address]  OPERATIVE REPORT  PATIENT NAME: Archana Fink  MR#: [facility] - [id number], [facility] - Missing  PMD: Meli Mensah  DATE OF PROCEDURE: 5/3/2021  PREOPERATIVE DIAGNOSIS: Degenerative Ar The posterior cruciate ligament was intact and competent. As such, a cruciate-retaining total knee arthroplasty was utilized. PROCEDURE: The patient was admitted to the same day surgery program on Monday, May 3, 2021.  Following proper identification in th chamfer, as well as trochlear cuts were made. Care was taken to avoid anterior femoral notching. The tibia was sized. It showed a size G tibial tray to be appropriate. The rotation was based between the medial and middle third of the tibial tubercle.  This

## 2021-05-04 NOTE — PLAN OF CARE
Pt is ao x 4, pt is sleeping with cpap, pt is voiding via urinal, pt is up with one and walker, will continue to monitor pt for safety    Problem: Diabetes/Glucose Control  Goal: Glucose maintained within prescribed range  Description: INTERVENTIONS:  - Mo Instruct pt to call for assistance with activity based on assessment  - Modify environment to reduce risk of injury  - Provide assistive devices as appropriate  - Consider OT/PT consult to assist with strengthening/mobility  - Encourage toileting schedule

## 2021-05-04 NOTE — PROGRESS NOTES
[facility]    Progress Note    Dong Hoffman Patient Status:  Outpatient in a Bed    1945 MRN [id number]   Location [facility] 4W/SW/SE Attending Derek Lara MD   Hosp Day # 0 PCP SUSAN Leyva MD       Subjective:

## (undated) DEVICE — TOTAL KNEE: Brand: MEDLINE INDUSTRIES, INC.

## (undated) DEVICE — ANTIBACTERIAL VIOLET BRAIDED (POLYGLACTIN 910), SYNTHETIC ABSORBABLE SUTURE: Brand: COATED VICRYL

## (undated) DEVICE — GAMMEX® PI HYBRID SIZE 7.5, STERILE POWDER-FREE SURGICAL GLOVE, POLYISOPRENE AND NEOPRENE BLEND: Brand: GAMMEX

## (undated) DEVICE — SOL  .9 3000ML

## (undated) DEVICE — GAUZE SPONGES,12 PLY: Brand: CURITY

## (undated) DEVICE — Device: Brand: STABLECUT®

## (undated) DEVICE — GOWN SURG AERO BLUE PERF LG

## (undated) DEVICE — Device

## (undated) DEVICE — 3M™ IOBAN™ 2 ANTIMICROBIAL INCISE DRAPE 6650EZ: Brand: IOBAN™ 2

## (undated) DEVICE — DRAPE SHEET LG

## (undated) DEVICE — 60 ML SYRINGE,TOOMEY TYPE: Brand: MONOJECT

## (undated) DEVICE — DERMABOND LIQUID ADHESIVE

## (undated) DEVICE — GAMMEX® PI HYBRID SIZE 6.5, STERILE POWDER-FREE SURGICAL GLOVE, POLYISOPRENE AND NEOPRENE BLEND: Brand: GAMMEX

## (undated) DEVICE — 33MM MG 2.5 HEX HEAD PIN-DJ

## (undated) DEVICE — 60 ML SYRINGE LUER-LOCK TIP: Brand: MONOJECT

## (undated) DEVICE — 50MM MG 2.5 HEX HEAD PIN-DJ

## (undated) DEVICE — DISPOSABLE TOURNIQUET CUFF SINGLE BLADDER, DUAL PORT AND QUICK CONNECT CONNECTOR: Brand: COLOR CUFF

## (undated) DEVICE — ANTIBACTERIAL UNDYED BRAIDED (POLYGLACTIN 910), SYNTHETIC ABSORBABLE SUTURE: Brand: COATED VICRYL

## (undated) DEVICE — COTTON UNDERCAST PADDING,REGULAR FINISH: Brand: WEBRIL

## (undated) DEVICE — SUTURE MONOCRYL 3-0 Y936H

## (undated) DEVICE — HOOD: Brand: FLYTE

## (undated) DEVICE — SOL  .9 1000ML BTL

## (undated) DEVICE — DRAPE SRG 70X60IN SPLT U IMPRV

## (undated) DEVICE — BOWL CEMENT MIX QUICK-VAC

## (undated) DEVICE — WRAP COOLING KNEE W/ICE PILLOW

## (undated) DEVICE — GAMMEX® PI HYBRID SIZE 8, STERILE POWDER-FREE SURGICAL GLOVE, POLYISOPRENE AND NEOPRENE BLEND: Brand: GAMMEX

## (undated) DEVICE — CHLORAPREP 26ML APPLICATOR

## (undated) DEVICE — SPINOCAN® 18 GA. X 3-1/2 IN. (90 MM) SPINAL NEEDLE: Brand: SPINOCAN®

## (undated) DEVICE — SYRINGE MNJCT 35ML LF STRL LL